# Patient Record
Sex: MALE | Race: WHITE | NOT HISPANIC OR LATINO | Employment: OTHER | URBAN - METROPOLITAN AREA
[De-identification: names, ages, dates, MRNs, and addresses within clinical notes are randomized per-mention and may not be internally consistent; named-entity substitution may affect disease eponyms.]

---

## 2017-06-13 ENCOUNTER — OFFICE VISIT (OUTPATIENT)
Dept: NEUROLOGY | Facility: MEDICAL CENTER | Age: 77
End: 2017-06-13
Payer: MEDICARE

## 2017-06-13 VITALS
WEIGHT: 170.4 LBS | DIASTOLIC BLOOD PRESSURE: 88 MMHG | HEIGHT: 65 IN | SYSTOLIC BLOOD PRESSURE: 140 MMHG | TEMPERATURE: 97.9 F | OXYGEN SATURATION: 96 % | HEART RATE: 80 BPM | BODY MASS INDEX: 28.39 KG/M2

## 2017-06-13 DIAGNOSIS — F03.90 DEMENTIA WITHOUT BEHAVIORAL DISTURBANCE, UNSPECIFIED DEMENTIA TYPE: ICD-10-CM

## 2017-06-13 DIAGNOSIS — R21 SKIN RASH: ICD-10-CM

## 2017-06-13 PROCEDURE — 99214 OFFICE O/P EST MOD 30 MIN: CPT | Performed by: PSYCHIATRY & NEUROLOGY

## 2017-06-13 RX ORDER — MEMANTINE HYDROCHLORIDE 10 MG/1
10 TABLET ORAL 2 TIMES DAILY
Qty: 180 TAB | Refills: 1 | Status: SHIPPED | OUTPATIENT
Start: 2017-06-13 | End: 2017-12-13 | Stop reason: SDUPTHER

## 2017-06-13 RX ORDER — CHLORAL HYDRATE 500 MG
1000 CAPSULE ORAL
COMMUNITY

## 2017-06-13 RX ORDER — DONEPEZIL HYDROCHLORIDE 10 MG/1
10 TABLET, FILM COATED ORAL DAILY
Qty: 90 TAB | Refills: 1 | Status: SHIPPED | OUTPATIENT
Start: 2017-06-13 | End: 2017-12-13 | Stop reason: SDUPTHER

## 2017-06-13 ASSESSMENT — PATIENT HEALTH QUESTIONNAIRE - PHQ9
CLINICAL INTERPRETATION OF PHQ2 SCORE: 6
5. POOR APPETITE OR OVEREATING: 0 - NOT AT ALL
SUM OF ALL RESPONSES TO PHQ QUESTIONS 1-9: 9

## 2017-06-13 NOTE — MR AVS SNAPSHOT
"        Jeronimo Hamlin   2017 11:40 AM   Office Visit   MRN: [id number]    Department:  [facility]   Dept Phone:  [phone]    Description:  Male : 1940   Provider:  Selina Reynoso M.D.           Reason for Visit     Follow-Up dementia      Allergies as of 2017     No Known Allergies      You were diagnosed with     Dementia without behavioral disturbance, unspecified dementia type   [2053736]       Skin rash   [[id number]]         Vital Signs     Blood Pressure Pulse Temperature Height Weight Body Mass Index    140/88 mmHg 80 36.6 °C (97.9 °F) 1.651 m (5' 5\") 77.293 kg (170 lb 6.4 oz) 28.36 kg/m2    Oxygen Saturation Smoking Status                96% Former Smoker          Basic Information     Date Of Birth Sex Race Ethnicity Preferred Language    1940 Male White Non- English      Your appointments     Dec 13, 2017 10:20 AM   Follow Up Visit with Selina Reynoso M.D.   [facility] (--)    [address] [place] [address], [address]  [facility] [address] [phone]           You will be receiving a confirmation call a few days before your appointment from our automated call confirmation system.              Problem List              ICD-10-CM Priority Class Noted - Resolved    Dementia without behavioral disturbance F03.90   10/3/2016 - Present    Skin rash R21   10/3/2016 - Present      Health Maintenance        Date Due Completion Dates    IMM DTaP/Tdap/Td Vaccine (1 - Tdap) 1958 ---    COLONOSCOPY 1989 ---    IMM ZOSTER VACCINE 1999 ---    IMM PNEUMOCOCCAL 65+ (ADULT) LOW/MEDIUM RISK SERIES (1 of 2 - PCV13) 2004 ---            Current Immunizations     No immunizations on file.      Below and/or attached are the medications your provider expects you to take. Review all of your home medications and newly ordered medications with your provider and/or pharmacist. Follow medication instructions as directed by your provider and/or pharmacist. Please keep your " medication list with you and share with your provider. Update the information when medications are discontinued, doses are changed, or new medications (including over-the-counter products) are added; and carry medication information at all times in the event of emergency situations     Allergies:  No Known Allergies          Medications  Valid as of: June 13, 2017 - 12:08 PM    Generic Name Brand Name Tablet Size Instructions for use    Cetirizine HCl (Tab) ZYRTEC 10 MG Take 10 mg by mouth every day.        Donepezil HCl (Tab) ARICEPT 10 MG Take 1 Tab by mouth every day.        Famotidine (Tab) PEPCID 20 MG Take 20 mg by mouth 2 times a day.        Iron   Take  by mouth.        Memantine HCl (Tab) NAMENDA 10 MG Take 1 Tab by mouth 2 times a day.        Montelukast Sodium (Tab) SINGULAIR 10 MG Take 10 mg by mouth every day.        Omega-3 Fatty Acids (Cap) fish oil 1000 MG Take 1,000 mg by mouth 3 times a day, with meals.        PredniSONE (Tab) DELTASONE 5 MG Take 5 mg by mouth every day.        PredniSONE (Tab) DELTASONE 20 MG Take 20 mg by mouth every day.        .                 Medicines prescribed today were sent to:     [facility] [address] [place] [address], [place] - [address]    [address] [facility] [place] [address]    Phone: [phone] Fax: [phone]    Open 24 Hours?: No      Medication refill instructions:       If your prescription bottle indicates you have medication refills left, it is not necessary to call your provider’s office. Please contact your pharmacy and they will refill your medication.    If your prescription bottle indicates you do not have any refills left, you may request refills at any time through one of the following ways: The online [employer] system (except Urgent Care), by calling your provider’s office, or by asking your pharmacy to contact your provider’s office with a refill request. Medication refills are processed only during regular business hours and may not be available  until the next business day. Your provider may request additional information or to have a follow-up visit with you prior to refilling your medication.   *Please Note: Medication refills are assigned a new Rx number when refilled electronically. Your pharmacy may indicate that no refills were authorized even though a new prescription for the same medication is available at the pharmacy. Please request the medicine by name with the pharmacy before contacting your provider for a refill.        Instructions      IMPRESSION:    1. Progressive Memory Loss for years , worse in the last one year or so--- Dementia is most likely-- Alzhemier is likely  2. Skin rash in the past one year or so-- on steroid for months-- seeing dermatologist and rheumatologist--- autoimmune disease  3. Hx of aorta aneurysm, abdominal hernia status post surgical repair    PLAN/RECOMMENDATIONS:    ________________________________________________________________________    Plan    Up Namenda 10mg two times per day for memory problems  Up Aricept to 10mg daily   Both Aricept and Namenda are good for memory and cognition  It is fine to try to stop and see whether these medicine help or not    We will monitor the memory, balance and may follow up MRI every 2 years    We will see Jeronimo in 6 months    Advise the following        ________________________________________________________________________    Advise exercise muscle, exercise brain, reduce weight  Yoga, Meditation or Praying could reduce stress  Sleep well, eating healthy  Fish Oil -- Omega 3 1000mg 3# daily  Try Daily Probiotic too  Vit D-3 4000 unit daily  ________________________________________________________________________      ________________________________________________________________________    Foods that inflame    Try to avoid or limit these foods as much as possible:     refined carbohydrates, such as white bread and pastries     French fries and other fried foods     soda  and other sugar-sweetened beverages     red meat (burgers, steaks) and processed meat (hot dogs, sausage)     margarine, shortening, and lard    Foods that combat inflammation     Include plenty of these anti-inflammatory foods in your diet:     tomatoes     olive oil     green leafy vegetables, such as spinach, kale, and collards     nuts like almonds and walnuts     fatty fish like salmon, mackerel, tuna, and sardines     fruits such as strawberries, blueberries, cherries, and oranges        ________________________________________________________________________    PLAN        ________________________________________________________________________      SIGNATURE:  Selina Reynoso    CC:  SEVERIANO Floreshart Access Code: Activation code not generated  Current MyChart Status: Active

## 2017-06-13 NOTE — PROGRESS NOTES
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    SOCIAL HISTORY:  Social History     Social History   • Marital Status: Single     Spouse Name: N/A   • Number of Children: N/A   • Years of Education: N/A     Occupational History   • Not on file.     Social History Main Topics   • Smoking status: Former Smoker   • Smokeless tobacco: Not on file   • Alcohol Use: No   • Drug Use: No   • Sexual Activity: Not on file     Other Topics Concern   • Not on file     Social History Narrative   • No narrative on file     ALLERGIES:  No Known Allergies  TOBHX  History   Smoking status   • Former Smoker   Smokeless tobacco   • Not on file     ALCHX  History   Alcohol Use No     DRUGHX  History   Drug Use No           MEDICATIONS:  Current Outpatient Prescriptions   Medication   • famotidine (PEPCID) 20 MG Tab   • cetirizine (ZYRTEC) 10 MG Tab   • memantine (NAMENDA) 10 MG Tab   • donepezil (ARICEPT) 5 MG Tab   • predniSONE (DELTASONE) 5 MG Tab   • predniSONE (DELTASONE) 20 MG Tab   • montelukast (SINGULAIR) 10 MG Tab     No current facility-administered medications for this visit.       REVIEW OF SYSTEM:    Constitutional: Denies fevers, Denies weight  "changes   Eyes: Denies changes in vision, no eye pain   Ears/Nose/Throat/Mouth: Denies nasal congestion or sore throat   Cardiovascular: aortic aneurysm status post surgery, HTN?  Respiratory: Denies SOB.   Gastrointestinal/Hepatic: Denies abdominal pain, nausea, vomiting, diarrhea, constipation or GI bleeding   Genitourinary: Denies bladder dysfunction, dysuria or frequency   Musculoskeletal/Rheum: Denies joint pain and swelling   Skin/Breast: skin rash for one year  Neurological: memory problems  Psychiatric: denies mood disorder   Endocrine: denies hx of diabetes or thyroid dysfunction   Heme/Oncology/Lymph Nodes: Denies enlarged lymph nodes, denies brusing or known bleeding disorder   Allergic/Immunologic: skin allergy-- peanut    PHYSICAL AND NEUROLOGICAL EXMAINATIONS:  VITAL SIGNS: Ht 1.651 m (5' 5\")  CURRENT WEIGHT:   BMI: There is no weight on file to calculate BMI.  PREVIOUS WEIGHTS:  Wt Readings from Last 25 Encounters:   12/16/16 74.571 kg (164 lb 6.4 oz)   10/03/16 74.027 kg (163 lb 3.2 oz)   07/11/16 72.576 kg (160 lb)       General appearance of patient: WDWN(+) NAD(+)    EYES  o Fundus : Papilledem(-) Exudates(-) Hemorrhage(-)  Nervous System  Orientation to time, place and person(+)  Memory normal(-) recall 0/3 ( two trials)  Language: aphasia(-)  Knowledge: past(+) Current(+)  Attention(+)  Cranial Nerves  • Nerve 2: intact  • Nerve 3,4,6: intact  • Nerve 5 : intact  • Nerve 7: intact  • Nerve 8: intact  • Nerve 9 & 10: intact  • Nerve 11: intact  • Nerve 12: intact  Muscle Power and muscle tone: symmetric, normal in upper and lower  Sensory System: Pin sensation intact(+)  Reflexes: symmetric throughout  Cerebellar Function FNP normal   Gait : Steady(+) TandemGait steady(-) able to walk without help  Heart and Vascular  Peripheral Vasucular system : Edema (-) Swelling(-)  RHB, Breathing sound clear  abdomen bowel sound normoactive  Extremities freely moveable  Joints no contracture         "       NEUROIMAGING: I reviewed the MRI/CT of brain     Got lost once  Denied fall    IMPRESSION:    1. Progressive Memory Loss for years , worse in the last one year or so--- Dementia is most likely-- Alzhemier is likely  2. Skin rash in the past one year or so-- on steroid for months-- seeing dermatologist and rheumatologist--- autoimmune disease  3. Hx of aorta aneurysm, abdominal hernia status post surgical repair    PLAN/RECOMMENDATIONS:    ________________________________________________________________________    Plan    Up Namenda 10mg two times per day for memory problems  Up Aricept to 10mg daily   Both Aricept and Namenda are good for memory and cognition  It is fine to try to stop and see whether these medicine help or not    We will monitor the memory, balance and may follow up MRI every 2 years    We will see Jeronimo in 6 months    Advise the following        ________________________________________________________________________    Advise exercise muscle, exercise brain, reduce weight  Yoga, Meditation or Praying could reduce stress  Sleep well, eating healthy  Fish Oil -- Omega 3 1000mg 3# daily  Try Daily Probiotic too  Vit D-3 4000 unit daily  ________________________________________________________________________      ________________________________________________________________________    Foods that inflame    Try to avoid or limit these foods as much as possible:     refined carbohydrates, such as white bread and pastries     French fries and other fried foods     soda and other sugar-sweetened beverages     red meat (burgers, steaks) and processed meat (hot dogs, sausage)     margarine, shortening, and lard    Foods that combat inflammation     Include plenty of these anti-inflammatory foods in your diet:     tomatoes     olive oil     green leafy vegetables, such as spinach, kale, and collards     nuts like almonds and walnuts     fatty fish like salmon, mackerel, tuna, and  sardines     fruits such as strawberries, blueberries, cherries, and oranges        ________________________________________________________________________    PLAN        ________________________________________________________________________      SIGNATURE:  Yen-Mongolian Edgardo    CC:  Gonsalo L Ricks, M.D.      Brain MRI--- Brain atrophy, Hippocampus atrophy      ________________________________________________________________________        ________________________________________________________________________

## 2017-06-13 NOTE — PATIENT INSTRUCTIONS
IMPRESSION:    1. Progressive Memory Loss for years , worse in the last one year or so--- Dementia is most likely-- Alzhemier is likely  2. Skin rash in the past one year or so-- on steroid for months-- seeing dermatologist and rheumatologist--- autoimmune disease  3. Hx of aorta aneurysm, abdominal hernia status post surgical repair    PLAN/RECOMMENDATIONS:    ________________________________________________________________________    Plan    Up Namenda 10mg two times per day for memory problems  Up Aricept to 10mg daily   Both Aricept and Namenda are good for memory and cognition  It is fine to try to stop and see whether these medicine help or not    We will monitor the memory, balance and may follow up MRI every 2 years    We will see Jeronimo in 6 months    Advise the following        ________________________________________________________________________    Advise exercise muscle, exercise brain, reduce weight  Yoga, Meditation or Praying could reduce stress  Sleep well, eating healthy  Fish Oil -- Omega 3 1000mg 3# daily  Try Daily Probiotic too  Vit D-3 4000 unit daily  ________________________________________________________________________      ________________________________________________________________________    Foods that inflame    Try to avoid or limit these foods as much as possible:     refined carbohydrates, such as white bread and pastries     French fries and other fried foods     soda and other sugar-sweetened beverages     red meat (burgers, steaks) and processed meat (hot dogs, sausage)     margarine, shortening, and lard    Foods that combat inflammation     Include plenty of these anti-inflammatory foods in your diet:     tomatoes     olive oil     green leafy vegetables, such as spinach, kale, and collards     nuts like almonds and walnuts     fatty fish like salmon, mackerel, tuna, and sardines     fruits such as strawberries, blueberries, cherries, and  oranges        ________________________________________________________________________    PLAN        ________________________________________________________________________      SIGNATURE:  Selina Reynoso    CC:  Gonsalo Bardales M.D.

## 2017-12-13 ENCOUNTER — OFFICE VISIT (OUTPATIENT)
Dept: NEUROLOGY | Facility: MEDICAL CENTER | Age: 77
End: 2017-12-13
Payer: MEDICARE

## 2017-12-13 VITALS
DIASTOLIC BLOOD PRESSURE: 80 MMHG | OXYGEN SATURATION: 97 % | RESPIRATION RATE: 18 BRPM | WEIGHT: 162 LBS | BODY MASS INDEX: 26.99 KG/M2 | TEMPERATURE: 97.5 F | HEIGHT: 65 IN | SYSTOLIC BLOOD PRESSURE: 120 MMHG | HEART RATE: 76 BPM

## 2017-12-13 DIAGNOSIS — R21 SKIN RASH: ICD-10-CM

## 2017-12-13 DIAGNOSIS — F02.80 DEMENTIA ASSOCIATED WITH OTHER UNDERLYING DISEASE WITHOUT BEHAVIORAL DISTURBANCE (HCC): ICD-10-CM

## 2017-12-13 DIAGNOSIS — F03.90 DEMENTIA WITHOUT BEHAVIORAL DISTURBANCE, UNSPECIFIED DEMENTIA TYPE: ICD-10-CM

## 2017-12-13 PROCEDURE — 99214 OFFICE O/P EST MOD 30 MIN: CPT | Performed by: PSYCHIATRY & NEUROLOGY

## 2017-12-13 RX ORDER — LISINOPRIL 5 MG/1
5 TABLET ORAL DAILY
COMMUNITY

## 2017-12-13 RX ORDER — MEMANTINE HYDROCHLORIDE 10 MG/1
10 TABLET ORAL 2 TIMES DAILY
Qty: 180 TAB | Refills: 3 | Status: SHIPPED | OUTPATIENT
Start: 2017-12-13

## 2017-12-13 RX ORDER — DONEPEZIL HYDROCHLORIDE 10 MG/1
10 TABLET, FILM COATED ORAL DAILY
Qty: 90 TAB | Refills: 3 | Status: SHIPPED | OUTPATIENT
Start: 2017-12-13

## 2017-12-13 NOTE — PROGRESS NOTES
NEUROLOGY NOTE    Referring Physician  Gonsalo Bardales      CHIEF COMPLAINT:  Memory problems for one year or so  Stable since last visit,  Plan to relocate to [facility] to unite with his son  Chief Complaint   Patient presents with   • Follow-Up     dementia        PRESENT ILLNESS:   Memory problems for one year or so  2 years ago, he was still able to provide service to family members    He lives with his sister    Since last visit, he got lost once in the nights    The patient did not notice much benefits to the medicine we gave to him  However, did not notice any side effects either  Generally speaking, the memory sounds getting worse    PAST MEDICAL HISTORY:  Past Medical History:   Diagnosis Date   • Cancer (CMS-HCC)     Prostate       PAST SURGICAL HISTORY:  Past Surgical History:   Procedure Laterality Date   • OTHER CARDIAC SURGERY      Aortic Aneurysm Repair       FAMILY HISTORY:  No family history on file.    SOCIAL HISTORY:  Social History     Social History   • Marital status: Single     Spouse name: N/A   • Number of children: N/A   • Years of education: N/A     Occupational History   • Not on file.     Social History Main Topics   • Smoking status: Former Smoker   • Smokeless tobacco: Not on file   • Alcohol use No   • Drug use: No   • Sexual activity: Not on file     Other Topics Concern   • Not on file     Social History Narrative   • No narrative on file     ALLERGIES:  No Known Allergies  TOBHX  History   Smoking Status   • Former Smoker   Smokeless Tobacco   • Not on file     ALCHX  History   Alcohol Use No     DRUGHX  History   Drug Use No           MEDICATIONS:  Current Outpatient Prescriptions   Medication   • lisinopril (PRINIVIL) 5 MG Tab   • IRON PO   • Omega-3 Fatty Acids (FISH OIL) 1000 MG Cap capsule   • donepezil (ARICEPT) 10 MG tablet   • memantine (NAMENDA) 10 MG Tab   • famotidine (PEPCID) 20 MG Tab   • cetirizine (ZYRTEC) 10 MG Tab   • predniSONE (DELTASONE) 5 MG Tab   • predniSONE  "(DELTASONE) 20 MG Tab   • montelukast (SINGULAIR) 10 MG Tab     No current facility-administered medications for this visit.        REVIEW OF SYSTEM:    Constitutional: Denies fevers, Denies weight changes   Eyes: Denies changes in vision, no eye pain   Ears/Nose/Throat/Mouth: Denies nasal congestion or sore throat   Cardiovascular: aortic aneurysm status post surgery, HTN?  Respiratory: Denies SOB.   Gastrointestinal/Hepatic: Denies abdominal pain, nausea, vomiting, diarrhea, constipation or GI bleeding   Genitourinary: Denies bladder dysfunction, dysuria or frequency   Musculoskeletal/Rheum: Denies joint pain and swelling   Skin/Breast: skin rash for one year  Neurological: memory problems  Psychiatric: denies mood disorder   Endocrine: denies hx of diabetes or thyroid dysfunction   Heme/Oncology/Lymph Nodes: Denies enlarged lymph nodes, denies brusing or known bleeding disorder   Allergic/Immunologic: skin allergy-- peanut    PHYSICAL AND NEUROLOGICAL EXMAINATIONS:  VITAL SIGNS: /80   Pulse 76   Temp 36.4 °C (97.5 °F)   Resp 18   Ht 1.651 m (5' 5\")   Wt 73.5 kg (162 lb)   SpO2 97%   BMI 26.96 kg/m²   CURRENT WEIGHT:   BMI: Body mass index is 26.96 kg/m².  PREVIOUS WEIGHTS:  Wt Readings from Last 25 Encounters:   12/13/17 73.5 kg (162 lb)   06/13/17 77.3 kg (170 lb 6.4 oz)   12/16/16 74.6 kg (164 lb 6.4 oz)   10/03/16 74 kg (163 lb 3.2 oz)   07/11/16 72.6 kg (160 lb)       General appearance of patient: WDWN(+) NAD(+)    EYES  o Fundus : Papilledem(-) Exudates(-) Hemorrhage(-)  Nervous System  Orientation to time, place and person(+)  Memory normal(-) recall 0/3 ( two trials)  Language: aphasia(-)  Knowledge: past(+) Current(+)  Attention(+)  Cranial Nerves  • Nerve 2: intact  • Nerve 3,4,6: intact  • Nerve 5 : intact  • Nerve 7: intact  • Nerve 8: intact  • Nerve 9 & 10: intact  • Nerve 11: intact  • Nerve 12: intact  Muscle Power and muscle tone: symmetric, normal in upper and lower  Sensory System: " Pin sensation intact(+)  Reflexes: symmetric throughout  Cerebellar Function FNP normal   Gait : Steady(+) TandemGait steady(-) able to walk without help  Heart and Vascular  Peripheral Vasucular system : Edema (-) Swelling(-)  RHB, Breathing sound clear  abdomen bowel sound normoactive  Extremities freely moveable  Joints no contracture         NEUROIMAGING: I reviewed the MRI/CT of brain     Got lost once  Denied fall    IMPRESSION:    1. Progressive Memory Loss for years , worse in the last one year or so--- Dementia is most likely-- Alzhemier is likely  2. Skin rash in the past one year or so-- on steroid for months-- seeing dermatologist and rheumatologist--- autoimmune skin disease? Allergy?  3. Hx of aorta aneurysm, abdominal hernia status post surgical repair    PLAN/RECOMMENDATIONS:    ________________________________________________________________________    Plan    Continue    Namenda 10mg two times per day for memory problems  Aricept to 10mg daily   Both Aricept and Namenda are good for memory and cognition    Advise monitor the memory, balance   May follow up MRI every 2 years      Advise the following  ________________________________________________________________________    Advise exercise muscle, exercise brain, reduce weight  Yoga, Meditation or Praying could reduce stress  Sleep well, eating healthy  Fish Oil -- Omega 3 1000mg 3# daily  Try Daily Probiotic too  Vit D-3 4000 unit daily  ________________________________________________________________________      ________________________________________________________________________    Foods that inflame    Try to avoid or limit these foods as much as possible:     refined carbohydrates, such as white bread and pastries     French fries and other fried foods     soda and other sugar-sweetened beverages     red meat (burgers, steaks) and processed meat (hot dogs, sausage)     margarine, shortening, and lard    Foods that combat  inflammation     Include plenty of these anti-inflammatory foods in your diet:     tomatoes     olive oil     green leafy vegetables, such as spinach, kale, and collards     nuts like almonds and walnuts     fatty fish like salmon, mackerel, tuna, and sardines     fruits such as strawberries, blueberries, cherries, and oranges        ________________________________________________________________________    PLAN        ________________________________________________________________________      SIGNATURE:  Selina Reynoso    CC:  Gonsalo Bardales M.D.      Brain MRI 10/2016--- Brain atrophy, some Hippocampus atrophy

## 2017-12-13 NOTE — PATIENT INSTRUCTIONS
IMPRESSION:    1. Progressive Memory Loss for years , worse in the last one year or so--- Dementia is most likely-- Alzhemier is likely  2. Skin rash in the past one year or so-- on steroid for months-- seeing dermatologist and rheumatologist--- autoimmune skin disease?  3. Hx of aorta aneurysm, abdominal hernia status post surgical repair    PLAN/RECOMMENDATIONS:    ________________________________________________________________________    Plan    Continue    Namenda 10mg two times per day for memory problems  Aricept to 10mg daily   Both Aricept and Namenda are good for memory and cognition    Advise monitor the memory, balance   May follow up MRI every 2 years      Advise the following  ________________________________________________________________________    Advise exercise muscle, exercise brain, reduce weight  Yoga, Meditation or Praying could reduce stress  Sleep well, eating healthy  Fish Oil -- Omega 3 1000mg 3# daily  Try Daily Probiotic too  Vit D-3 4000 unit daily  ________________________________________________________________________      ________________________________________________________________________    Foods that inflame    Try to avoid or limit these foods as much as possible:     refined carbohydrates, such as white bread and pastries     French fries and other fried foods     soda and other sugar-sweetened beverages     red meat (burgers, steaks) and processed meat (hot dogs, sausage)     margarine, shortening, and lard    Foods that combat inflammation     Include plenty of these anti-inflammatory foods in your diet:     tomatoes     olive oil     green leafy vegetables, such as spinach, kale, and collards     nuts like almonds and walnuts     fatty fish like salmon, mackerel, tuna, and sardines     fruits such as strawberries, blueberries, cherries, and  oranges        ________________________________________________________________________    PLAN        ________________________________________________________________________      SIGNATURE:  Selina Reynoso    CC:  Gonsalo Bardales M.D.      Brain MRI 10/2016--- Brain atrophy, some Hippocampus atrophy

## 2018-08-14 ENCOUNTER — TELEPHONE (OUTPATIENT)
Dept: DERMATOLOGY | Facility: IMAGING CENTER | Age: 78
End: 2018-08-14